# Patient Record
Sex: MALE | Race: WHITE | NOT HISPANIC OR LATINO | Employment: FULL TIME | URBAN - METROPOLITAN AREA
[De-identification: names, ages, dates, MRNs, and addresses within clinical notes are randomized per-mention and may not be internally consistent; named-entity substitution may affect disease eponyms.]

---
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
## 2025-01-06 ENCOUNTER — LAB (OUTPATIENT)
Dept: LAB | Facility: OTHER | Age: 20
End: 2025-01-06
Payer: COMMERCIAL

## 2025-01-06 DIAGNOSIS — E29.1 MALE HYPOGONADISM: ICD-10-CM

## 2025-01-06 LAB
HCT VFR BLD AUTO: 43.4 % (ref 40–53)
HGB BLD-MCNC: 14.9 G/DL (ref 13.3–17.7)

## 2025-01-06 PROCEDURE — 85014 HEMATOCRIT: CPT

## 2025-01-06 PROCEDURE — 84403 ASSAY OF TOTAL TESTOSTERONE: CPT

## 2025-01-06 PROCEDURE — 85018 HEMOGLOBIN: CPT

## 2025-01-06 PROCEDURE — 88230 TISSUE CULTURE LYMPHOCYTE: CPT

## 2025-01-06 PROCEDURE — 88263 CHROMOSOME ANALYSIS 45: CPT

## 2025-01-06 PROCEDURE — 36415 COLL VENOUS BLD VENIPUNCTURE: CPT

## 2025-01-06 NOTE — TELEPHONE ENCOUNTER
RECORDS RECEIVED FROM: Internal   DATE RECEIVED: 3/11/25   NOTES (FOR ALL VISITS) STATUS DETAILS   OFFICE NOTES from referring provider Internal 9/10/24 -Genna Burns CNP in [facility]   OFFICE NOTES from other specialist Internal 10/1/24 - Vito Allen MD in  UROLOGY    MEDICATION LIST Internal    LABS     DIABETES: HBGA1C, CREATININE, FASTING LIPIDS, MICROALBUMIN URINE, POTASSIUM, TSH, T4    THYROID: TSH, T4, CBC, THYRODLONULIN, TOTAL T3, FREE T4, CALCITONIN, CEA Internal

## 2025-01-08 LAB — TESTOST SERPL-MCNC: 8 NG/DL (ref 240–950)

## 2025-01-14 ENCOUNTER — OFFICE VISIT (OUTPATIENT)
Dept: UROLOGY | Facility: OTHER | Age: 20
End: 2025-01-14
Attending: UROLOGY
Payer: COMMERCIAL

## 2025-01-14 VITALS
HEART RATE: 84 BPM | DIASTOLIC BLOOD PRESSURE: 80 MMHG | OXYGEN SATURATION: 97 % | RESPIRATION RATE: 16 BRPM | SYSTOLIC BLOOD PRESSURE: 123 MMHG

## 2025-01-14 DIAGNOSIS — Q55.0 ABSENT TESTIS: Primary | ICD-10-CM

## 2025-01-14 ASSESSMENT — PAIN SCALES - GENERAL: PAINLEVEL_OUTOF10: NO PAIN (0)

## 2025-01-14 NOTE — PROGRESS NOTES
Ivone follows up for hypogonadism. The testosterone prescription wasn't covered by insurance but he is going to check today. His chromosome analysis is also not back yet. He feels generally tired while awaiting testosterone replacement. Repeat total testosterone was 8 ng/dl.    We discussed the potential meaning of hypergonadotropic hypogonadism. If he does have testicles they are likely nonfunctional, implying that he will struggle with infertility. He does not recall having ever had imaging to determine if he has testicles or not. He definitely did not have a diagnostic laparoscopy.    Await insurance determination for exogenous testosterone replacement. If this is unsuccessful we will use [facility].

## 2025-02-10 DIAGNOSIS — R79.89 LOW TESTOSTERONE IN MALE: Primary | ICD-10-CM

## 2025-03-11 ENCOUNTER — PRE VISIT (OUTPATIENT)
Dept: ENDOCRINOLOGY | Facility: CLINIC | Age: 20
End: 2025-03-11

## 2025-03-11 ENCOUNTER — VIRTUAL VISIT (OUTPATIENT)
Dept: ENDOCRINOLOGY | Facility: CLINIC | Age: 20
End: 2025-03-11
Attending: NURSE PRACTITIONER
Payer: COMMERCIAL

## 2025-03-11 VITALS — WEIGHT: 144 LBS | HEIGHT: 74 IN | BODY MASS INDEX: 18.48 KG/M2

## 2025-03-11 DIAGNOSIS — E29.1 PRIMARY HYPOGONADISM IN MALE: ICD-10-CM

## 2025-03-11 PROCEDURE — 98003 SYNCH AUDIO-VIDEO NEW HI 60: CPT | Performed by: STUDENT IN AN ORGANIZED HEALTH CARE EDUCATION/TRAINING PROGRAM

## 2025-03-11 PROCEDURE — 1126F AMNT PAIN NOTED NONE PRSNT: CPT | Mod: 95 | Performed by: STUDENT IN AN ORGANIZED HEALTH CARE EDUCATION/TRAINING PROGRAM

## 2025-03-11 PROCEDURE — 99417 PROLNG OP E/M EACH 15 MIN: CPT | Performed by: STUDENT IN AN ORGANIZED HEALTH CARE EDUCATION/TRAINING PROGRAM

## 2025-03-11 PROCEDURE — G2211 COMPLEX E/M VISIT ADD ON: HCPCS | Performed by: STUDENT IN AN ORGANIZED HEALTH CARE EDUCATION/TRAINING PROGRAM

## 2025-03-11 RX ORDER — NEEDLES, DISPOSABLE 25GX5/8"
1 NEEDLE, DISPOSABLE MISCELLANEOUS
Qty: 8 EACH | Refills: 3 | Status: SHIPPED | OUTPATIENT
Start: 2025-03-11

## 2025-03-11 RX ORDER — TESTOSTERONE CYPIONATE 200 MG/ML
100 INJECTION, SOLUTION INTRAMUSCULAR
Qty: 6 ML | Refills: 3 | Status: SHIPPED | OUTPATIENT
Start: 2025-03-11

## 2025-03-11 ASSESSMENT — PAIN SCALES - GENERAL: PAINLEVEL_OUTOF10: NO PAIN (0)

## 2025-03-11 NOTE — PROGRESS NOTES
Endocrinology Clinic Visit 3/11/2025      Video-Visit Details    Type of service:  Video Visit    Video Start Time (time video started): 9:09 AM    Video End Time (time video stopped): 9:34 AM    Originating Location (pt. Location): Home        Distant Location (provider location):  Off-site    Mode of Communication:  Video Conference via [employer]    Physician has received verbal consent for a Video Visit from the patient? Yes    I spent a total of 79 minutes on the date of encounter reviewing medical records, evaluating the patient, coordinating care and documenting in the EHR, as detailed above.  The longitudinal plan of care for the diagnosis(es)/condition(s) as documented were addressed during this visit. Due to the added complexity in care, I will continue to support Ivone in the subsequent management and with ongoing continuity of care.    NAME:  Ivone Heaton  PCP:  Genna Burns  MRN:  [id number]  Reason for Consult: Primary hypogonadism  Requesting Provider:  Genna Burns    Chief Complaint     Chief Complaint   Patient presents with    Consult       History of Present Illness     Ivone Heaton is a 19 year old male who is seen in video visit for hypogonadism.  Today is his first visit with me.    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  Was found to have normal karyotype.  At birth his right testis was not palpated while left testis was palpable higher in the inguinal canal.  He was seen by pediatric urology in 2005 examination under anesthesia was done was found to have bilateral undescended testes.  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  Chromosomal analysis was done at that time and reported to be normal.  He was seen  by pediatric endocrinology first time in May 2017 evaluation showed elevated  LH 45.3 and testosterone level of 7 ng/dL he was started at that time on testosterone 50 mg IM every 4 weeks the dose was increased gradually over the time up to 100 mg every 2 weeks.    On most recent available visit in the records with pediatric endocrinology on 5/30/2023 his height was at 88 percentile  weight was at 28 percentile and BMI was at 4 percentile.    On assessment today:  Secondary sexual characteristics: started to develop secondary sexual characteristics at age of 13 , he developed facial hair and deepening of the voice and pubic hair.    Current height at 91 percentile 1.86 m.  Current weight at 36 percentile.  He has been on testosterone cypionate 100 mg every 2 weeks 05/2017-06/2024 following that his PCP tried to prescribe it for him but the insurance did not approve   Adherence: good when he was on it   Injection sites issues: no   Libido: had normal libido when he was receiving testosterone then developed loss of libido after stopping testosterone   Energy level: Was feeling energetic on testosterone but after stopping it feel fatigued   Erections: Was getting erections on Testosterone became less frequent after stopping it   Ejaculations: able to ejaculate.   Gynecomastia: no     Relevant family history: no     Relevant workup:  2005: Chromosome analysis normal male karyotype.  12/4/2021: TSH was low borderline 0.49 (0.5-6)  1/15/2022: Free T3 normal 1.3, free total T4 normal 6.4, TSH normal 0.6, TRAb <1.00  9/11/2024: Free testosterone calculated low 0.28, total testosterone low 13, FSH elevated 104, LH elevated 43.5.  1/6/2025: Hematocrit 43.4, hemoglobin 14.9.  Total testosterone 8.0 chromosome analysis 46 XY karyotype no numerical or structural chromosomal abnormality was found. All 50 of the metaphase cells examined had one X and one Y chromosome present. No evidence of peripheral blood sex  "chromosome mosaicism was found.       Problem List     Patient Active Problem List   Diagnosis    Absent testis    Primary hypogonadism in male        Medications     Current Outpatient Medications   Medication Sig Dispense Refill    needle, disp, (BD HYPODERMIC NEEDLE) 18G X 1\" MISC 1 each by In Vitro route every 14 days. Use to draw up testosterone only. 8 each 3    syringe/needle, disp, 23G X 1\" 3 ML MISC 1 each every 14 days. 8 each 3    testosterone cypionate (DEPOTESTOSTERONE) 200 MG/ML injection Inject 0.5 mLs (100 mg) into the muscle every 14 days. 6 mL 3     No current facility-administered medications for this visit.        Allergies     Allergies   Allergen Reactions    Bactrim [Sulfamethoxazole-Trimethoprim] Hives       Medical / Surgical History     Past Medical History:   Diagnosis Date    Testosterone deficiency     Started in childhood     No past surgical history on file.    Social History     Social History     Socioeconomic History    Marital status: Single     Spouse name: Not on file    Number of children: Not on file    Years of education: Not on file    Highest education level: Not on file   Occupational History    Not on file   Tobacco Use    Smoking status: Never    Smokeless tobacco: Never   Vaping Use    Vaping status: Never Used   Substance and Sexual Activity    Alcohol use: Not Currently    Drug use: Never    Sexual activity: Yes     Comment: partner on birth control.   Other Topics Concern    Not on file   Social History Narrative    Not on file     Social Drivers of Health     Financial Resource Strain: Low Risk  (9/3/2024)    Financial Resource Strain     Within the past 12 months, have you or your family members you live with been unable to get utilities (heat, electricity) when it was really needed?: No   Food Insecurity: Low Risk  (9/3/2024)    Food Insecurity     Within the past 12 months, did you worry that your food would run out before you got money to buy more?: No     Within " "the past 12 months, did the food you bought just not last and you didn t have money to get more?: No   Transportation Needs: Low Risk  (9/3/2024)    Transportation Needs     Within the past 12 months, has lack of transportation kept you from medical appointments, getting your medicines, non-medical meetings or appointments, work, or from getting things that you need?: No   Physical Activity: Not on file   Stress: Not on file   Social Connections: Not on file   Interpersonal Safety: Not At Risk (9/21/2024)    [facility]     IP Custom IPV     Do you feel UNSAFE in any of your personal relationships with your family members or any other acquaintances?: No   Housing Stability: Low Risk  (9/3/2024)    Housing Stability     Do you have housing? : Yes     Are you worried about losing your housing?: No       Family History     No family history on file.    ROS     12 ROS completed, pertinent positive and negative in HPI    Physical Exam   Ht 1.88 m (6' 2\")   Wt 65.3 kg (144 lb)   BMI 18.49 kg/m     GENERAL: alert and no distress, adult male deep voice, has male pattern facial hair.  EYES: Eyes grossly normal to inspection.  No discharge or erythema, or obvious scleral/conjunctival abnormalities.  RESP: No audible wheeze, cough, or visible cyanosis.    SKIN: Visible skin clear. No significant rash, abnormal pigmentation or lesions.  NEURO: Cranial nerves grossly intact.  Mentation and speech appropriate for age.  PSYCH: Appropriate affect, tone, and pace of words     Labs/Imaging     Pertinent Labs were reviewed and discussed briefly.  Radiology Results were  reviewed.    Summary of recent findings:      Latest Reference Range & Units 09/11/24 07:56 01/06/25 08:02   Free Testosterone Calculated ng/dL 0.28    FSH 1.5 - 12.4 mIU/mL 104.0 (H)    Luteinizing Hormone 1.7 - 8.6 mIU/mL 43.5 (H)    Testosterone Total 240 - 950 ng/dL 13 (L) 8 (L)   (H): Data is abnormally " high  (L): Data is abnormally low    CHROMOSOME ANALYSIS, BLOOD, SEX CHROMOSOME With Professional Interpretation: [id number]  Order: [id number]  Collected 1/6/2025  8:02 AM       Status: Final result       Visible to patient: Yes (not seen)       Dx: Male hypogonadism    0 Result Notes      Component    ISCN    46,XY   Interpretation    These findings represent a normal 46,XY male karyotype.  No numerical or structural chromosomal abnormality was found.  All 50 of the metaphase cells examined had one X and one Y chromosome present.  No evidence of peripheral blood sex chromosome mosaicism was found.     Electronically signed by Syl Shah, Ph.D., [facility], Director Cytogenetics Laboratory and Cosigned by Jonna Bautista M.D., [facility] on 1/17/25 at 5:11 PM.   Methods    G-BAND ANALYSIS:        Metaphases analyzed:                  10     Metaphases screened:                  40    Metaphases karyotyped:               2    Banding utilized:                            G-banding  Band resolution:                            550-700           PHA stimulated, non-synchronized and MTX synchronized cultures.   Resulting Agency [facility]             Specimen Collected: 01/06/25  8:02 AM Last Resulted: 01/17/25  5:11 PM      Latest Reference Range & Units 01/06/25 08:02   Hemoglobin 13.3 - 17.7 g/dL 14.9   Hematocrit 40.0 - 53.0 % 43.4     I personally reviewed the patient's outside records from [facility] EMR and Care Everywhere. Summary of pertinent findings in HPI.    Impression / Plan     1.  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  Was found to have elevated FSH and LH with almost  undetectable testosterone level.  Chromosomal analysis was normal.    He developed all secondary sexual characteristics and had normal growth.    Most recent dose of testosterone cypionate was 100 mg every 2 weeks has been on that dose for years until June 2024.    Impression: Primary hypogonadism due to bilateral testicular atrophy due to undescended testes bilaterally with blind-ending spermatic vessels.    Plan:  -To start back on testosterone cypionate 100 mg every 2 weeks.  -To get testosterone/hematocrit level checked after 2 months.  At midcycle.  -To get repeat test after 4 months at midcycle.  -Follow-up in 4 months.      Test and/or medications prescribed today:  Orders Placed This Encounter   Procedures    Testosterone total    Hematocrit    Testosterone total    Hematocrit         Follow up: 4 months with labs    ANDRZEJ eH  Endocrinology, Diabetes and Metabolism  [facility]    Note: Chart documentation done in part with Dragon Voice Recognition software. Although reviewed after completion, some word and grammatical errors may remain.  Please consider this when interpreting information in this chart

## 2025-03-11 NOTE — PATIENT INSTRUCTIONS
- To start using Testosterone cypionate 100 mg (0.5 ml ) every 2 weeks  - To get the lab test after 2 months on day 7 from receiving the dose and then again 4 months   - Follow up in 4 months with labs prior to the visit.

## 2025-03-11 NOTE — NURSING NOTE
Current patient location: [address] [place] [id number]    Is the patient currently in the state of [place]? YES    Visit mode: VIDEO    If the visit is dropped, the patient can be reconnected by:VIDEO VISIT: Send to e-mail at: [email or web address]    Will anyone else be joining the visit? NO  (If patient encounters technical issues they should call [phone]    Are changes needed to the allergy or medication list? No    Are refills needed on medications prescribed by this physician? NO    Rooming Documentation:  Unable to complete questionnaire(s) due to time    Reason for visit: Consult    Laurie EDEN

## 2025-03-11 NOTE — LETTER
3/11/2025       RE: Ivone Heaton  [address] [facility] [address]     Dear Colleague,    Thank you for referring your patient, Ivone Heaton, to the [facility] ENDOCRINOLOGY CLINIC [place] [facility]. Please see a copy of my visit note below.    Endocrinology Clinic Visit 3/11/2025      Video-Visit Details    Type of service:  Video Visit    Video Start Time (time video started): 9:09 AM    Video End Time (time video stopped): 9:34 AM    Originating Location (pt. Location): Home        Distant Location (provider location):  Off-site    Mode of Communication:  Video Conference via [facility]    Physician has received verbal consent for a Video Visit from the patient? Yes    I spent a total of 79 minutes on the date of encounter reviewing medical records, evaluating the patient, coordinating care and documenting in the EHR, as detailed above.  The longitudinal plan of care for the diagnosis(es)/condition(s) as documented were addressed during this visit. Due to the added complexity in care, I will continue to support Ivone in the subsequent management and with ongoing continuity of care.    NAME:  Ivone Heaton  PCP:  Genna Burns  MRN:  [id number]  Reason for Consult: Primary hypogonadism  Requesting Provider:  Genna Burns    Chief Complaint     Chief Complaint   Patient presents with     Consult       History of Present Illness     Ivone Heaton is a 19 year old male who is seen in video visit for hypogonadism.  Today is his first visit with me.    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  Was found to have normal karyotype.  At birth his right testis was not palpated while left testis was palpable higher in the inguinal canal.  He was seen by pediatric urology in 2005 examination under anesthesia was done was found to have bilateral  undescended testes.  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  Chromosomal analysis was done at that time and reported to be normal.  He was seen by pediatric endocrinology first time in May 2017 evaluation showed elevated  LH 45.3 and testosterone level of 7 ng/dL he was started at that time on testosterone 50 mg IM every 4 weeks the dose was increased gradually over the time up to 100 mg every 2 weeks.    On most recent available visit in the records with pediatric endocrinology on 5/30/2023 his height was at 88 percentile  weight was at 28 percentile and BMI was at 4 percentile.    On assessment today:  Secondary sexual characteristics: started to develop secondary sexual characteristics at age of 13 , he developed facial hair and deepening of the voice and pubic hair.    Current height at 91 percentile 1.86 m.  Current weight at 36 percentile.  He has been on testosterone cypionate 100 mg every 2 weeks 05/2017-06/2024 following that his PCP tried to prescribe it for him but the insurance did not approve   Adherence: good when he was on it   Injection sites issues: no   Libido: had normal libido when he was receiving testosterone then developed loss of libido after stopping testosterone   Energy level: Was feeling energetic on testosterone but after stopping it feel fatigued   Erections: Was getting erections on Testosterone became less frequent after stopping it   Ejaculations: able to ejaculate.   Gynecomastia: no     Relevant family history: no     Relevant workup:  2005: Chromosome analysis normal male karyotype.  12/4/2021: TSH was low borderline 0.49 (0.5-6)  1/15/2022: Free T3 normal 1.3, free total T4 normal 6.4, TSH normal 0.6, TRAb <1.00  9/11/2024: Free testosterone calculated low 0.28, total testosterone low 13, FSH elevated  "104, LH elevated 43.5.  1/6/2025: Hematocrit 43.4, hemoglobin 14.9.  Total testosterone 8.0 chromosome analysis 46 XY karyotype no numerical or structural chromosomal abnormality was found. All 50 of the metaphase cells examined had one X and one Y chromosome present. No evidence of peripheral blood sex chromosome mosaicism was found.       Problem List     Patient Active Problem List   Diagnosis     Absent testis     Primary hypogonadism in male        Medications     Current Outpatient Medications   Medication Sig Dispense Refill     needle, disp, (BD HYPODERMIC NEEDLE) 18G X 1\" MISC 1 each by In Vitro route every 14 days. Use to draw up testosterone only. 8 each 3     syringe/needle, disp, 23G X 1\" 3 ML MISC 1 each every 14 days. 8 each 3     testosterone cypionate (DEPOTESTOSTERONE) 200 MG/ML injection Inject 0.5 mLs (100 mg) into the muscle every 14 days. 6 mL 3     No current facility-administered medications for this visit.        Allergies     Allergies   Allergen Reactions     Bactrim [Sulfamethoxazole-Trimethoprim] Hives       Medical / Surgical History     Past Medical History:   Diagnosis Date     Testosterone deficiency     Started in childhood     No past surgical history on file.    Social History     Social History     Socioeconomic History     Marital status: Single     Spouse name: Not on file     Number of children: Not on file     Years of education: Not on file     Highest education level: Not on file   Occupational History     Not on file   Tobacco Use     Smoking status: Never     Smokeless tobacco: Never   Vaping Use     Vaping status: Never Used   Substance and Sexual Activity     Alcohol use: Not Currently     Drug use: Never     Sexual activity: Yes     Comment: partner on birth control.   Other Topics Concern     Not on file   Social History Narrative     Not on file     Social Drivers of Health     Financial Resource Strain: Low Risk  (9/3/2024)    Financial Resource Strain      Within " "the past 12 months, have you or your family members you live with been unable to get utilities (heat, electricity) when it was really needed?: No   Food Insecurity: Low Risk  (9/3/2024)    Food Insecurity      Within the past 12 months, did you worry that your food would run out before you got money to buy more?: No      Within the past 12 months, did the food you bought just not last and you didn t have money to get more?: No   Transportation Needs: Low Risk  (9/3/2024)    Transportation Needs      Within the past 12 months, has lack of transportation kept you from medical appointments, getting your medicines, non-medical meetings or appointments, work, or from getting things that you need?: No   Physical Activity: Not on file   Stress: Not on file   Social Connections: Not on file   Interpersonal Safety: Not At Risk (9/21/2024)    Received from [facility] and [facility]     IP Custom IPV      Do you feel UNSAFE in any of your personal relationships with your family members or any other acquaintances?: No   Housing Stability: Low Risk  (9/3/2024)    Housing Stability      Do you have housing? : Yes      Are you worried about losing your housing?: No       Family History     No family history on file.    ROS     12 ROS completed, pertinent positive and negative in HPI    Physical Exam   Ht 1.88 m (6' 2\")   Wt 65.3 kg (144 lb)   BMI 18.49 kg/m     GENERAL: alert and no distress, adult male deep voice, has male pattern facial hair.  EYES: Eyes grossly normal to inspection.  No discharge or erythema, or obvious scleral/conjunctival abnormalities.  RESP: No audible wheeze, cough, or visible cyanosis.    SKIN: Visible skin clear. No significant rash, abnormal pigmentation or lesions.  NEURO: Cranial nerves grossly intact.  Mentation and speech appropriate for age.  PSYCH: Appropriate affect, tone, and pace of words     Labs/Imaging     Pertinent Labs were reviewed and discussed " briefly.  Radiology Results were  reviewed.    Summary of recent findings:      Latest Reference Range & Units 09/11/24 07:56 01/06/25 08:02   Free Testosterone Calculated ng/dL 0.28    FSH 1.5 - 12.4 mIU/mL 104.0 (H)    Luteinizing Hormone 1.7 - 8.6 mIU/mL 43.5 (H)    Testosterone Total 240 - 950 ng/dL 13 (L) 8 (L)   (H): Data is abnormally high  (L): Data is abnormally low    CHROMOSOME ANALYSIS, BLOOD, SEX CHROMOSOME With Professional Interpretation: [id number]  Order: [id number]  Collected 1/6/2025  8:02 AM       Status: Final result       Visible to patient: Yes (not seen)       Dx: Male hypogonadism    0 Result Notes      Component    ISCN    46,XY   Interpretation    These findings represent a normal 46,XY male karyotype.  No numerical or structural chromosomal abnormality was found.  All 50 of the metaphase cells examined had one X and one Y chromosome present.  No evidence of peripheral blood sex chromosome mosaicism was found.     Electronically signed by Syl Shah, Ph.D., [facility], Director Cytogenetics Laboratory and Cosigned by Jonan Bautista M.D., [facility] on 1/17/25 at 5:11 PM.   Methods    G-BAND ANALYSIS:        Metaphases analyzed:                  10     Metaphases screened:                  40    Metaphases karyotyped:               2    Banding utilized:                            G-banding  Band resolution:                            550-700           PHA stimulated, non-synchronized and MTX synchronized cultures.   Resulting Agency [facility]             Specimen Collected: 01/06/25  8:02 AM Last Resulted: 01/17/25  5:11 PM      Latest Reference Range & Units 01/06/25 08:02   Hemoglobin 13.3 - 17.7 g/dL 14.9   Hematocrit 40.0 - 53.0 % 43.4     I personally reviewed the patient's outside records from [facility] EMR and Care Everywhere. Summary of pertinent findings in HPI.    Impression / Plan     1.  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  Was found to have elevated FSH and LH with almost undetectable testosterone level.  Chromosomal analysis was normal.    He developed all secondary sexual characteristics and had normal growth.    Most recent dose of testosterone cypionate was 100 mg every 2 weeks has been on that dose for years until June 2024.    Impression: Primary hypogonadism due to bilateral testicular atrophy due to undescended testes bilaterally with blind-ending spermatic vessels.    Plan:  -To start back on testosterone cypionate 100 mg every 2 weeks.  -To get testosterone/hematocrit level checked after 2 months.  At midcycle.  -To get repeat test after 4 months at midcycle.  -Follow-up in 4 months.      Test and/or medications prescribed today:  Orders Placed This Encounter   Procedures     Testosterone total     Hematocrit     Testosterone total     Hematocrit         Follow up: 4 months with labs    ANDRZEJ He  Endocrinology, Diabetes and Metabolism  [facility]    Note: Chart documentation done in part with Dragon Voice Recognition software. Although reviewed after completion, some word and grammatical errors may remain.  Please consider this when interpreting information in this chart        Again, thank you for allowing me to participate in the care of your patient.      Sincerely,    ANDRZEJ He

## 2025-04-16 ENCOUNTER — TELEPHONE (OUTPATIENT)
Dept: ENDOCRINOLOGY | Facility: CLINIC | Age: 20
End: 2025-04-16
Payer: COMMERCIAL

## 2025-04-16 NOTE — TELEPHONE ENCOUNTER
Patient confirmed scheduled appointment:  Labs 05/12/25 at 10:00  Labs 07/22/25 at 10:00  MARIE with Yeni 08/05/25 at 8:30  Visit type: RETURN ENDOCRINE  Provider: ANDRZEJ He  Location: [facility] DIABETES  Testing/imaging: N/A  Additional notes: Scheduled per pt. No preference b/w in person and video visits, but unavailable for first in person visit. Will be in [place] for appt.      Return in about 4 months (around 7/11/2025).  - To start using Testosterone cypionate 100 mg (0.5 ml ) every 2 weeks  - To get the lab test after 2 months on day 7 from receiving the dose and then again 4 months   - Follow up in 4 months with labs prior to the visit.        Joi Ruvalcaba on 4/16/2025 at 3:37 PM

## 2025-05-15 ENCOUNTER — LAB (OUTPATIENT)
Dept: LAB | Facility: OTHER | Age: 20
End: 2025-05-15
Payer: COMMERCIAL

## 2025-05-15 DIAGNOSIS — E29.1 PRIMARY HYPOGONADISM IN MALE: ICD-10-CM

## 2025-05-15 LAB
HCT VFR BLD AUTO: 42.3 % (ref 40–53)
MCV RBC AUTO: 86 FL (ref 78–100)

## 2025-05-15 PROCEDURE — 85014 HEMATOCRIT: CPT | Performed by: FAMILY MEDICINE

## 2025-05-15 PROCEDURE — 84403 ASSAY OF TOTAL TESTOSTERONE: CPT | Performed by: FAMILY MEDICINE

## 2025-05-15 PROCEDURE — 36415 COLL VENOUS BLD VENIPUNCTURE: CPT | Performed by: FAMILY MEDICINE

## 2025-05-20 ENCOUNTER — RESULTS FOLLOW-UP (OUTPATIENT)
Dept: ENDOCRINOLOGY | Facility: CLINIC | Age: 20
End: 2025-05-20

## 2025-05-20 LAB — TESTOST SERPL-MCNC: 8 NG/DL (ref 240–950)

## 2025-05-29 ENCOUNTER — PATIENT OUTREACH (OUTPATIENT)
Dept: CARE COORDINATION | Facility: CLINIC | Age: 20
End: 2025-05-29
Payer: COMMERCIAL

## 2025-08-01 ENCOUNTER — OFFICE VISIT (OUTPATIENT)
Dept: FAMILY MEDICINE | Facility: OTHER | Age: 20
End: 2025-08-01
Attending: NURSE PRACTITIONER

## 2025-08-01 VITALS
HEART RATE: 92 BPM | TEMPERATURE: 98 F | WEIGHT: 157 LBS | HEIGHT: 74 IN | OXYGEN SATURATION: 97 % | SYSTOLIC BLOOD PRESSURE: 114 MMHG | RESPIRATION RATE: 16 BRPM | DIASTOLIC BLOOD PRESSURE: 80 MMHG | BODY MASS INDEX: 20.15 KG/M2

## 2025-08-01 DIAGNOSIS — R06.02 SHORTNESS OF BREATH: ICD-10-CM

## 2025-08-01 DIAGNOSIS — R00.2 PALPITATIONS: Primary | ICD-10-CM

## 2025-08-01 DIAGNOSIS — F41.9 ANXIETY: ICD-10-CM

## 2025-08-01 PROCEDURE — 93000 ELECTROCARDIOGRAM COMPLETE: CPT | Performed by: INTERNAL MEDICINE

## 2025-08-01 RX ORDER — HYDROXYZINE HYDROCHLORIDE 25 MG/1
25 TABLET, FILM COATED ORAL 3 TIMES DAILY PRN
Qty: 60 TABLET | Refills: 2 | Status: SHIPPED | OUTPATIENT
Start: 2025-08-01

## 2025-08-01 RX ORDER — SERTRALINE HYDROCHLORIDE 25 MG/1
25 TABLET, FILM COATED ORAL DAILY
Qty: 90 TABLET | Refills: 3 | Status: SHIPPED | OUTPATIENT
Start: 2025-08-01

## 2025-08-01 RX ORDER — ALBUTEROL SULFATE 90 UG/1
2 INHALANT RESPIRATORY (INHALATION) EVERY 6 HOURS PRN
Qty: 18 G | Refills: 0 | Status: SHIPPED | OUTPATIENT
Start: 2025-08-01

## 2025-08-01 ASSESSMENT — ANXIETY QUESTIONNAIRES
1. FEELING NERVOUS, ANXIOUS, OR ON EDGE: SEVERAL DAYS
GAD7 TOTAL SCORE: 5
7. FEELING AFRAID AS IF SOMETHING AWFUL MIGHT HAPPEN: NOT AT ALL
GAD7 TOTAL SCORE: 5
8. IF YOU CHECKED OFF ANY PROBLEMS, HOW DIFFICULT HAVE THESE MADE IT FOR YOU TO DO YOUR WORK, TAKE CARE OF THINGS AT HOME, OR GET ALONG WITH OTHER PEOPLE?: SOMEWHAT DIFFICULT
7. FEELING AFRAID AS IF SOMETHING AWFUL MIGHT HAPPEN: NOT AT ALL
IF YOU CHECKED OFF ANY PROBLEMS ON THIS QUESTIONNAIRE, HOW DIFFICULT HAVE THESE PROBLEMS MADE IT FOR YOU TO DO YOUR WORK, TAKE CARE OF THINGS AT HOME, OR GET ALONG WITH OTHER PEOPLE: SOMEWHAT DIFFICULT
5. BEING SO RESTLESS THAT IT IS HARD TO SIT STILL: NOT AT ALL
3. WORRYING TOO MUCH ABOUT DIFFERENT THINGS: SEVERAL DAYS
GAD7 TOTAL SCORE: 5
6. BECOMING EASILY ANNOYED OR IRRITABLE: NEARLY EVERY DAY
2. NOT BEING ABLE TO STOP OR CONTROL WORRYING: NOT AT ALL
4. TROUBLE RELAXING: NOT AT ALL

## 2025-08-01 ASSESSMENT — PATIENT HEALTH QUESTIONNAIRE - PHQ9
SUM OF ALL RESPONSES TO PHQ QUESTIONS 1-9: 9
10. IF YOU CHECKED OFF ANY PROBLEMS, HOW DIFFICULT HAVE THESE PROBLEMS MADE IT FOR YOU TO DO YOUR WORK, TAKE CARE OF THINGS AT HOME, OR GET ALONG WITH OTHER PEOPLE: EXTREMELY DIFFICULT
SUM OF ALL RESPONSES TO PHQ QUESTIONS 1-9: 9

## 2025-08-01 ASSESSMENT — PAIN SCALES - GENERAL: PAINLEVEL_OUTOF10: MILD PAIN (2)

## 2025-08-04 LAB
ATRIAL RATE - MUSE: 69 BPM
DIASTOLIC BLOOD PRESSURE - MUSE: NORMAL MMHG
INTERPRETATION ECG - MUSE: NORMAL
P AXIS - MUSE: 37 DEGREES
PR INTERVAL - MUSE: 156 MS
QRS DURATION - MUSE: 90 MS
QT - MUSE: 386 MS
QTC - MUSE: 413 MS
R AXIS - MUSE: 90 DEGREES
SYSTOLIC BLOOD PRESSURE - MUSE: NORMAL MMHG
T AXIS - MUSE: 71 DEGREES
VENTRICULAR RATE- MUSE: 69 BPM

## 2025-08-12 ENCOUNTER — VIRTUAL VISIT (OUTPATIENT)
Dept: ENDOCRINOLOGY | Facility: CLINIC | Age: 20
End: 2025-08-12

## 2025-08-12 DIAGNOSIS — E29.1 PRIMARY HYPOGONADISM IN MALE: Primary | ICD-10-CM

## 2025-08-28 ENCOUNTER — TELEPHONE (OUTPATIENT)
Dept: ENDOCRINOLOGY | Facility: CLINIC | Age: 20
End: 2025-08-28

## 2025-08-28 DIAGNOSIS — E29.1 PRIMARY HYPOGONADISM IN MALE: ICD-10-CM

## 2025-08-28 RX ORDER — TESTOSTERONE CYPIONATE 200 MG/ML
100 INJECTION, SOLUTION INTRAMUSCULAR
Qty: 6 ML | Refills: 3 | Status: SHIPPED | OUTPATIENT
Start: 2025-08-28

## 2025-08-28 RX ORDER — NEEDLES, DISPOSABLE 25GX5/8"
1 NEEDLE, DISPOSABLE MISCELLANEOUS
Qty: 8 EACH | Refills: 3 | Status: SHIPPED | OUTPATIENT
Start: 2025-08-28